# Patient Record
Sex: FEMALE | Race: WHITE | NOT HISPANIC OR LATINO | URBAN - METROPOLITAN AREA
[De-identification: names, ages, dates, MRNs, and addresses within clinical notes are randomized per-mention and may not be internally consistent; named-entity substitution may affect disease eponyms.]

---

## 2017-05-02 ENCOUNTER — AMBULATORY - HEALTHEAST (OUTPATIENT)
Dept: ADMINISTRATIVE | Facility: CLINIC | Age: 82
End: 2017-05-02

## 2017-05-02 RX ORDER — ATORVASTATIN CALCIUM 20 MG/1
20 TABLET, FILM COATED ORAL DAILY
Status: SHIPPED | COMMUNITY
Start: 2016-10-26

## 2017-05-02 RX ORDER — ALBUTEROL SULFATE 90 UG/1
2 AEROSOL, METERED RESPIRATORY (INHALATION) EVERY 4 HOURS PRN
Status: SHIPPED | COMMUNITY
Start: 2016-10-26

## 2017-05-02 RX ORDER — AMOXICILLIN 250 MG
1 CAPSULE ORAL 2 TIMES DAILY PRN
Status: SHIPPED | COMMUNITY
Start: 2017-05-02

## 2017-05-02 RX ORDER — ALBUTEROL SULFATE 5 MG/ML
2.5 SOLUTION RESPIRATORY (INHALATION) EVERY 6 HOURS PRN
Status: SHIPPED | COMMUNITY
Start: 2017-02-03

## 2017-05-02 RX ORDER — LOSARTAN POTASSIUM 100 MG/1
100 TABLET ORAL DAILY
Status: SHIPPED | COMMUNITY
Start: 2016-10-26

## 2017-05-02 RX ORDER — CYCLOBENZAPRINE HCL 5 MG
5 TABLET ORAL 2 TIMES DAILY
Status: SHIPPED | COMMUNITY
Start: 2017-05-02

## 2017-05-02 RX ORDER — TIOTROPIUM BROMIDE 18 UG/1
18 CAPSULE ORAL; RESPIRATORY (INHALATION) DAILY
Status: SHIPPED | COMMUNITY
Start: 2016-10-26

## 2017-05-02 RX ORDER — LIDOCAINE 50 MG/G
1 PATCH TOPICAL EVERY 24 HOURS
Status: SHIPPED | COMMUNITY
Start: 2017-05-02

## 2017-05-02 RX ORDER — ACETAMINOPHEN 325 MG/1
325 TABLET ORAL EVERY 4 HOURS PRN
Status: SHIPPED | COMMUNITY
Start: 2017-05-02

## 2017-05-02 RX ORDER — TRAMADOL HYDROCHLORIDE 50 MG/1
50 TABLET ORAL EVERY 6 HOURS PRN
Status: SHIPPED | COMMUNITY
Start: 2017-05-02

## 2017-05-04 ENCOUNTER — OFFICE VISIT - HEALTHEAST (OUTPATIENT)
Dept: GERIATRICS | Facility: CLINIC | Age: 82
End: 2017-05-04

## 2017-05-04 DIAGNOSIS — I67.1 CEREBRAL ANEURYSM: ICD-10-CM

## 2017-05-04 DIAGNOSIS — R27.0 ATAXIA: ICD-10-CM

## 2017-05-04 DIAGNOSIS — R09.02 HYPOXIA: ICD-10-CM

## 2017-05-04 DIAGNOSIS — M81.0 OSTEOPOROSIS: ICD-10-CM

## 2017-05-04 DIAGNOSIS — M41.9 SCOLIOSIS OF LUMBAR SPINE: ICD-10-CM

## 2017-05-04 DIAGNOSIS — C44.90 SKIN CANCER: ICD-10-CM

## 2017-05-04 DIAGNOSIS — M51.369 DDD (DEGENERATIVE DISC DISEASE), LUMBAR: ICD-10-CM

## 2017-05-04 DIAGNOSIS — I10 HYPERTENSION: ICD-10-CM

## 2017-05-04 DIAGNOSIS — I27.20 PULMONARY HYPERTENSION (H): ICD-10-CM

## 2017-05-04 DIAGNOSIS — R55 SYNCOPE, UNSPECIFIED SYNCOPE TYPE: ICD-10-CM

## 2017-05-04 DIAGNOSIS — S72.002D CLOSED FRACTURE OF LEFT HIP, WITH ROUTINE HEALING, SUBSEQUENT ENCOUNTER: ICD-10-CM

## 2017-05-04 DIAGNOSIS — J44.9 COPD (CHRONIC OBSTRUCTIVE PULMONARY DISEASE) (H): ICD-10-CM

## 2017-05-04 DIAGNOSIS — F32.89 MINOR DEPRESSIVE DISORDER: ICD-10-CM

## 2017-05-08 ENCOUNTER — OFFICE VISIT - HEALTHEAST (OUTPATIENT)
Dept: GERIATRICS | Facility: CLINIC | Age: 82
End: 2017-05-08

## 2017-05-08 DIAGNOSIS — R60.9 EDEMA: ICD-10-CM

## 2017-05-08 DIAGNOSIS — I10 ESSENTIAL HYPERTENSION: ICD-10-CM

## 2017-05-08 DIAGNOSIS — M81.0 OSTEOPOROSIS: ICD-10-CM

## 2017-05-08 DIAGNOSIS — S72.002D CLOSED FRACTURE OF LEFT HIP, WITH ROUTINE HEALING, SUBSEQUENT ENCOUNTER: ICD-10-CM

## 2017-05-08 DIAGNOSIS — I67.1 CEREBRAL ANEURYSM: ICD-10-CM

## 2017-05-08 DIAGNOSIS — M51.369 DDD (DEGENERATIVE DISC DISEASE), LUMBAR: ICD-10-CM

## 2017-05-08 DIAGNOSIS — R09.02 HYPOXIA: ICD-10-CM

## 2017-05-08 DIAGNOSIS — F32.89 MINOR DEPRESSIVE DISORDER: ICD-10-CM

## 2017-05-08 DIAGNOSIS — I27.20 PULMONARY HYPERTENSION (H): ICD-10-CM

## 2017-05-08 DIAGNOSIS — J44.9 COPD (CHRONIC OBSTRUCTIVE PULMONARY DISEASE) (H): ICD-10-CM

## 2017-05-08 DIAGNOSIS — R27.0 ATAXIA: ICD-10-CM

## 2017-05-10 RX ORDER — HYDRALAZINE HYDROCHLORIDE 10 MG/1
10 TABLET, FILM COATED ORAL 3 TIMES DAILY
Status: SHIPPED | COMMUNITY
Start: 2017-05-10

## 2017-05-10 RX ORDER — FUROSEMIDE 20 MG
20 TABLET ORAL DAILY
Status: SHIPPED | COMMUNITY
Start: 2017-05-10

## 2017-05-11 ENCOUNTER — OFFICE VISIT - HEALTHEAST (OUTPATIENT)
Dept: GERIATRICS | Facility: CLINIC | Age: 82
End: 2017-05-11

## 2017-05-11 DIAGNOSIS — R60.9 EDEMA: ICD-10-CM

## 2017-05-11 DIAGNOSIS — I27.20 PULMONARY HYPERTENSION (H): ICD-10-CM

## 2017-05-11 DIAGNOSIS — I10 ESSENTIAL HYPERTENSION: ICD-10-CM

## 2017-05-11 DIAGNOSIS — R09.02 HYPOXIA: ICD-10-CM

## 2017-05-11 DIAGNOSIS — R63.0 LACK OF APPETITE: ICD-10-CM

## 2017-05-11 DIAGNOSIS — R27.0 ATAXIA: ICD-10-CM

## 2017-05-11 DIAGNOSIS — M51.369 DDD (DEGENERATIVE DISC DISEASE), LUMBAR: ICD-10-CM

## 2017-05-11 DIAGNOSIS — J44.9 COPD (CHRONIC OBSTRUCTIVE PULMONARY DISEASE) (H): ICD-10-CM

## 2017-05-11 DIAGNOSIS — S72.002D CLOSED FRACTURE OF LEFT HIP, WITH ROUTINE HEALING, SUBSEQUENT ENCOUNTER: ICD-10-CM

## 2017-05-11 DIAGNOSIS — I67.1 CEREBRAL ANEURYSM: ICD-10-CM

## 2017-05-11 DIAGNOSIS — F41.9 ANXIETY: ICD-10-CM

## 2017-05-16 ENCOUNTER — OFFICE VISIT - HEALTHEAST (OUTPATIENT)
Dept: GERIATRICS | Facility: CLINIC | Age: 82
End: 2017-05-16

## 2017-05-16 DIAGNOSIS — S72.002D CLOSED FRACTURE OF LEFT HIP, WITH ROUTINE HEALING, SUBSEQUENT ENCOUNTER: ICD-10-CM

## 2017-05-16 DIAGNOSIS — M41.9 SCOLIOSIS OF LUMBAR SPINE: ICD-10-CM

## 2017-05-16 DIAGNOSIS — M51.369 DDD (DEGENERATIVE DISC DISEASE), LUMBAR: ICD-10-CM

## 2017-05-16 DIAGNOSIS — R55 SYNCOPE, UNSPECIFIED SYNCOPE TYPE: ICD-10-CM

## 2017-05-16 RX ORDER — BUSPIRONE HYDROCHLORIDE 15 MG/1
15 TABLET ORAL 3 TIMES DAILY
Status: SHIPPED | COMMUNITY
Start: 2017-05-16

## 2017-05-16 RX ORDER — MEGESTROL ACETATE 40 MG/1
200 TABLET ORAL 2 TIMES DAILY
Status: SHIPPED | COMMUNITY
Start: 2017-05-16

## 2017-05-19 ENCOUNTER — OFFICE VISIT - HEALTHEAST (OUTPATIENT)
Dept: GERIATRICS | Facility: CLINIC | Age: 82
End: 2017-05-19

## 2017-05-19 DIAGNOSIS — S72.002D CLOSED FRACTURE OF LEFT HIP, WITH ROUTINE HEALING, SUBSEQUENT ENCOUNTER: ICD-10-CM

## 2017-05-19 DIAGNOSIS — M41.9 SCOLIOSIS OF LUMBAR SPINE: ICD-10-CM

## 2017-05-19 DIAGNOSIS — M81.0 OSTEOPOROSIS: ICD-10-CM

## 2017-05-19 DIAGNOSIS — R63.0 POOR APPETITE: ICD-10-CM

## 2017-05-19 DIAGNOSIS — J44.9 CHRONIC OBSTRUCTIVE PULMONARY DISEASE, UNSPECIFIED COPD TYPE (H): ICD-10-CM

## 2017-05-19 DIAGNOSIS — I27.20 PULMONARY HYPERTENSION (H): ICD-10-CM

## 2017-05-19 DIAGNOSIS — I10 ESSENTIAL HYPERTENSION: ICD-10-CM

## 2017-05-19 DIAGNOSIS — R55 SYNCOPE, UNSPECIFIED SYNCOPE TYPE: ICD-10-CM

## 2017-05-23 ENCOUNTER — OFFICE VISIT - HEALTHEAST (OUTPATIENT)
Dept: GERIATRICS | Facility: CLINIC | Age: 82
End: 2017-05-23

## 2017-05-23 DIAGNOSIS — R09.02 HYPOXIA: ICD-10-CM

## 2017-05-23 DIAGNOSIS — I10 ESSENTIAL HYPERTENSION: ICD-10-CM

## 2017-05-23 DIAGNOSIS — I27.20 PULMONARY HYPERTENSION (H): ICD-10-CM

## 2017-05-23 DIAGNOSIS — J44.9 COPD (CHRONIC OBSTRUCTIVE PULMONARY DISEASE) (H): ICD-10-CM

## 2017-05-23 DIAGNOSIS — S72.002D CLOSED FRACTURE OF LEFT HIP, WITH ROUTINE HEALING, SUBSEQUENT ENCOUNTER: ICD-10-CM

## 2017-05-23 DIAGNOSIS — M81.0 OSTEOPOROSIS: ICD-10-CM

## 2017-05-23 DIAGNOSIS — M51.369 DDD (DEGENERATIVE DISC DISEASE), LUMBAR: ICD-10-CM

## 2017-05-23 DIAGNOSIS — M41.9 SCOLIOSIS OF LUMBAR SPINE: ICD-10-CM

## 2017-05-26 ENCOUNTER — AMBULATORY - HEALTHEAST (OUTPATIENT)
Dept: GERIATRICS | Facility: CLINIC | Age: 82
End: 2017-05-26

## 2021-06-10 NOTE — PROGRESS NOTES
[facility]      Facility:    [facility] [[id number]]    Code Status: FULL CODE      Chief Complaint/Reason for Visit:  Chief Complaint   Patient presents with     Problem Visit     hip fx         HPI:   Antonio is a 85 y.o. female who has a history of hypertension, severe COPD on chronic oxygen, pulmonary hypertension and syncope.     She was admitted to [facility] on April-26-20 17 after a fall. She is not sure if she passed out. She had a syncopal workup done a couple years prior which did not reveal cardiac findings for syncope.  Imaging showed comminuted mildly displaced anterior trochanteric left femur fracture with moderate varus.deformity. Pelvic x-ray did not find fractures. She had an intramedullary agustina with interlocking femoral neck screw fixation on 4-.     Hemoglobin went from 12.6 down to 6.9 during the hospitalization, and she received 1 unit of packed red cells. Hemoglobin was 8.2 on discharge. She was on Lovenox for 2 weeks per Orthopedics, at follow-up they will determine longer therapy is warranted.( Blood Loss Anemia)     For her possible syncope she was admitted to telemetry, cardiology saw her. Echocardiogram was done showing diastolic dysfunction, pulmonary hypertension. Further workup was not deemed necessary during the hospitalization.She and her primary care can con whether or not she needs on  sending her to Radiology for further cardiac workup as an outpatient.     Her hydralazine had been held for hypotension. Plan was to restart if she got hypertensive in the TCU     She was hypoxic throughout the hospitalization.       Past Medical History:  Past Medical History:   Diagnosis Date     Acute exacerbation of chronic obstructive pulmonary disease (COPD)      Ataxia      Cerebral aneurysm      Closed fracture of left hip      COPD (chronic obstructive pulmonary disease)      DDD (degenerative disc disease), lumbar      Hyperglycemia       Hyperlipidemia      Hypertension      Hypoxia      Minor depressive disorder      Osteoporosis      Pulmonary hypertension      Raynaud's syndrome      Scoliosis of lumbar spine      Skin cancer      Syncope            Review of Systems     Worried about her high BPs ( Her Hydralazine was held in the hospital due to hypotension)  Walked 70 ' with therapist      Vitals:    05/08/17 0930   BP: 174/75   Pulse: 82   Resp: 20   Temp: 97.3  F (36.3  C)   SpO2: 98%       Physical Exam   Pleasant  Cardiovascular: Regular rhythm and normal heart sounds.   No murmur heard.  Pulmonary/Chest: end inspiratory rales bilaterally, fine   Abdominal: Soft. Bowel sounds are normal. There is no tenderness.   Musculoskeletal:   No drainage on surgical incision area  2+ edema of the left lower leg and ankle   Lymphadenopathy:   She has no cervical adenopathy.   Neurological: She is alert.   Short-term memory deficits,delay in response at some times   Skin: Skin is warm and dry. No rash noted.   Psychiatric: She has a normal mood and affect. Her behavior is normal.       Medication List:  Current Outpatient Prescriptions   Medication Sig     hydrALAZINE (APRESOLINE) 10 MG tablet Take 10 mg by mouth 3 (three) times a day.     acetaminophen (TYLENOL) 325 MG tablet Take 325 mg by mouth every 4 (four) hours as needed.     albuterol (PROAIR HFA;PROVENTIL HFA;VENTOLIN HFA) 90 mcg/actuation inhaler Inhale 2 puffs every 4 (four) hours as needed.     albuterol (PROVENTIL) 5 mg/mL nebulizer solution Inhale 2.5 mg every 6 (six) hours as needed.     atorvastatin (LIPITOR) 20 MG tablet Take 20 mg by mouth daily.     cyclobenzaprine (FLEXERIL) 5 MG tablet Take 5 mg by mouth 2 (two) times a day.     diltiazem (CARDIZEM LA) 120 mg 24 hr tablet Take 120 mg by mouth daily.     enoxaparin (LOVENOX) 40 mg/0.4 mL syringe Inject 40 mg under the skin daily.     fluticasone (FLONASE) 50 mcg/actuation nasal spray 1 spray into each nostril daily as needed.      fluticasone-salmeterol (ADVAIR) 100-50 mcg/dose DISKUS Inhale 1 puff 2 (two) times a day.     lidocaine (LIDODERM) 5 % Place 1 patch on the skin daily. up to 12 hours within a 24-hour period     losartan (COZAAR) 100 MG tablet Take 100 mg by mouth daily.     peg 400-propylene glycol (SYSTANE) 0.4-0.3 % Drop Administer 2 drops to both eyes 3 (three) times a day.     senna-docusate (PERICOLACE) 8.6-50 mg tablet Take 1 tablet by mouth 2 (two) times a day as needed.     tiotropium (SPIRIVA) 18 mcg inhalation capsule Place 18 mcg into inhaler and inhale daily.     traMADol (ULTRAM) 50 mg tablet Take 50 mg by mouth every 6 (six) hours as needed.       Labs:  Hb incr to 9.5  Decrease Na 133    Assessment / Plan :  1. Closed fracture of left hip, with routine healing, subsequent encounter S72.002D PT,OT. Pain control w tramadol. Lovenox . Lidoerm patch    2. COPD (chronic obstructive pulmonary disease) J44.9 Advair , Spiriva ,albuterol inhaler, albuterol nebulizer    3. Pulmonary hypertension I27.2     4. Hypertension I10 diltiazem LA , losartan . Add back Hydralazine   5. DDD (degenerative disc disease), lumbar M51.36     6. Cerebral aneurysm I67.1  not all cognitively there   7. Ataxia R27.0     8. Hypoxia R09.02     9. Osteoporosis M81.0     10 Edema R60.9 Lasix 20 mgdaily. Weight M/W/F   10. Syncope, unspecified syncope type R55            Electronically signed by: Chante Quiroz MD

## 2021-06-10 NOTE — PROGRESS NOTES
[facility]      Facility:    [facility] [[id number]]    Code Status: FULL CODE      Chief Complaint/Reason for Visit:  Chief Complaint   Patient presents with     Problem Visit     left hip fracture         HPI:   Antonio is a 85 y.o. female who has a history of hypertension, severe COPD on chronic oxygen, pulmonary hypertension and syncope.     She was admitted to [facility] on April-26-20 17 after a fall. She is not sure if she passed out. She had a syncopal workup done a couple years prior which did not reveal cardiac findings for syncope.  Imaging showed comminuted mildly displaced anterior trochanteric left femur fracture with moderate varus.deformity. Pelvic x-ray did not find fractures. She had an intramedullary agustina with interlocking femoral neck screw fixation on 4-.     Hemoglobin went from 12.6 down to 6.9 during the hospitalization, and she received 1 unit of packed red cells. Hemoglobin was 8.2 on discharge. She was on Lovenox for 2 weeks per Orthopedics, at follow-up they will determine longer therapy is warranted.( Blood Loss Anemia)     For her possible syncope she was admitted to telemetry, cardiology saw her. Echocardiogram was done showing diastolic dysfunction, pulmonary hypertension. Further workup was not deemed necessary during the hospitalization.She and her primary care can con whether or not she needs on  sending her to Radiology for further cardiac workup as an outpatient.     Her hydralazine had been held for hypotension. Plan was to restart if she got hypertensive in the TCU     She was hypoxic throughout the hospitalization.       Past Medical History:  Past Medical History:   Diagnosis Date     Acute exacerbation of chronic obstructive pulmonary disease (COPD)      Ataxia      Cerebral aneurysm      Closed fracture of left hip      COPD (chronic obstructive pulmonary disease)      DDD (degenerative disc disease), lumbar       Hyperglycemia      Hyperlipidemia      Hypertension      Hypoxia      Minor depressive disorder      Osteoporosis      Pulmonary hypertension      Raynaud's syndrome      Scoliosis of lumbar spine      Skin cancer      Syncope          Current Outpatient Prescriptions   Medication Sig     acetaminophen (TYLENOL) 325 MG tablet Take 325 mg by mouth every 4 (four) hours as needed.     albuterol (PROAIR HFA;PROVENTIL HFA;VENTOLIN HFA) 90 mcg/actuation inhaler Inhale 2 puffs every 4 (four) hours as needed.     albuterol (PROVENTIL) 5 mg/mL nebulizer solution Inhale 2.5 mg every 6 (six) hours as needed.     atorvastatin (LIPITOR) 20 MG tablet Take 20 mg by mouth daily.     busPIRone (BUSPAR) 15 MG tablet Take 15 mg by mouth 3 (three) times a day.     cyclobenzaprine (FLEXERIL) 5 MG tablet Take 5 mg by mouth 2 (two) times a day.     diltiazem (CARDIZEM LA) 120 mg 24 hr tablet Take 120 mg by mouth daily.     fluticasone (FLONASE) 50 mcg/actuation nasal spray 1 spray into each nostril daily as needed.     fluticasone-salmeterol (ADVAIR) 100-50 mcg/dose DISKUS Inhale 1 puff 2 (two) times a day.     furosemide (LASIX) 20 MG tablet Take 20 mg by mouth daily.     hydrALAZINE (APRESOLINE) 10 MG tablet Take 10 mg by mouth 3 (three) times a day.     lidocaine (LIDODERM) 5 % Place 1 patch on the skin daily. up to 12 hours within a 24-hour period     losartan (COZAAR) 100 MG tablet Take 100 mg by mouth daily.     megestrol (MEGACE) 40 MG tablet Take 200 mg by mouth 2 (two) times a day.     peg 400-propylene glycol (SYSTANE) 0.4-0.3 % Drop Administer 2 drops to both eyes 3 (three) times a day.     senna-docusate (PERICOLACE) 8.6-50 mg tablet Take 1 tablet by mouth 2 (two) times a day as needed.     tiotropium (SPIRIVA) 18 mcg inhalation capsule Place 18 mcg into inhaler and inhale daily.     traMADol (ULTRAM) 50 mg tablet Take 50 mg by mouth every 6 (six) hours as needed.           Review of Systems   Appetite is a bit better  Pain  just after therapy, Tylenol helps.  Sleeping well    Physical Exam   Pleasant  Cardiovascular: Regular rhythm and normal heart sounds. No murmur   Pulmonary/Chest: end inspiratory rales bilaterally, fine   Abdominal: Soft. Bowel sounds are normal. There is no tenderness.   Musculoskeletal:   No drainage on surgical incision area  2+ edema of the left lower leg and ankle    Neurological: She is alert.   Memory more intact  Skin: Skin is warm and dry. No rash noted.   Psychiatric: She has a normal mood and affect. Her behavior is normal.        Assessment / Plan :    ICD-10-CM    1. Closed fracture of left hip, with routine healing, subsequent encounter S72.002D Rehabilitating well.  Has nearly completed therapy    2. Syncope, unspecified syncope type R55  no cardiac source was found in the hospital    3. Essential hypertension I10  continues on losartan    4. Chronic obstructive pulmonary disease, unspecified COPD type J44.9  albuterol inhaler and nebulizer, fluticasone salmeterol , Spiriva    5. Pulmonary hypertension I27.2    6. Scoliosis of lumbar spine M41.9    7  poor appetite  R63.0  Megace    8. Osteoporosis M81.0          Doing well, will complete therapy within a week        Electronically signed by: Chante Quiroz MD

## 2021-06-10 NOTE — PROGRESS NOTES
[facility]      Facility:    [facility] [[id number]]    Code Status: FULL CODE      Chief Complaint/Reason for Visit:  Chief Complaint   Patient presents with     Problem Visit     face to face for 4 wheeled walker         HPI:   Antonio is a 85 y.o. female who has a history of hypertension, severe COPD on chronic oxygen, pulmonary hypertension and syncope.     She was admitted to [facility] on April-26-20 17 after a fall. She is not sure if she passed out. She had a syncopal workup done a couple years prior which did not reveal cardiac findings for syncope.  Imaging showed comminuted mildly displaced anterior trochanteric left femur fracture with moderate varus.deformity. Pelvic x-ray did not find fractures. She had an intramedullary agustina with interlocking femoral neck screw fixation on 4-.     Hemoglobin went from 12.6 down to 6.9 during the hospitalization, and she received 1 unit of packed red cells. Hemoglobin was 8.2 on discharge. She was on Lovenox for 2 weeks per Orthopedics, at follow-up they will determine longer therapy is warranted.( Blood Loss Anemia)     For her possible syncope she was admitted to telemetry, cardiology saw her. Echocardiogram was done showing diastolic dysfunction, pulmonary hypertension. Further workup was not deemed necessary during the hospitalization.She and her primary care can con whether or not she needs on  sending her to Radiology for further cardiac workup as an outpatient.     Her hydralazine had been held for hypotension. Plan was to restart if she got hypertensive in the TCU     She was hypoxic throughout the hospitalization.       Past Medical History:  Past Medical History:   Diagnosis Date     Acute exacerbation of chronic obstructive pulmonary disease (COPD)      Ataxia      Cerebral aneurysm      Closed fracture of left hip      COPD (chronic obstructive pulmonary disease)      DDD (degenerative disc disease),  lumbar      Hyperglycemia      Hyperlipidemia      Hypertension      Hypoxia      Minor depressive disorder      Osteoporosis      Pulmonary hypertension      Raynaud's syndrome      Scoliosis of lumbar spine      Skin cancer      Syncope            Review of Systems   Appetite is a bit better  Pain just after therapy, Tylenol helps    Physical Exam   Pleasant  Cardiovascular: Regular rhythm and normal heart sounds. No murmur   Pulmonary/Chest: end inspiratory rales bilaterally, fine   Abdominal: Soft. Bowel sounds are normal. There is no tenderness.   Musculoskeletal:   No drainage on surgical incision area  2+ edema of the left lower leg and ankle    Neurological: She is alert.   Memory more intact  Skin: Skin is warm and dry. No rash noted.   Psychiatric: She has a normal mood and affect. Her behavior is normal.      Face to Face 5/16/17 for a Four wheeled Walker    She suffered a comminuted mildly displaced anterior trochanteric left femur fracture at the end of April 2017.  She has significant impaired mobility affecting her mobility related activities of daily living  Patient's mobility limitation cannot be sufficiently resolved by the use of an appropriately fitted cane or walker  [facility]'s home has adequate access between rooms, maneuvering space, and surfaces for the use of manual wheelchair  Manual wheelchair will significantly improve the patient's ability to participate in more mobility related ADLs and she will use it daily many hours a day at home  Patient is willing to use the manual wheelchair that is provided  She does have sufficient upper extremity function and physical/mental capabilities to safely self propel the wheelchair    Order for 4 wheeled walker with brakes and seat start date 5-15-17  Estimated length of need: Lifetime      Assessment / Plan :      ICD-10-CM    1. Closed fracture of left hip, with routine healing, subsequent encounter S72.002D    2. Syncope, unspecified syncope type R55     3. Scoliosis of lumbar spine M41.9    4. DDD (degenerative disc disease), lumbar M51.36          Submit to [facility]    Electronically signed by: Chante Quiroz MD

## 2021-06-10 NOTE — PROGRESS NOTES
[facility]    Facility:   [facility] [[id number]]     Code Status: FULL CODE  PCP: Nadia Dickerson MD   Phone: [phone]   Fax: [phone]      CHIEF COMPLAINT/REASON FOR VISIT:  Chief Complaint   Patient presents with     Discharge Summary     hip fx       HISTORY COURSE:  Ms. Perkins was admitted to [facility] on April-26-20 17 after a fall. She is not sure if she passed out. She had a syncopal workup done a couple years prior which did not reveal cardiac findings for syncope.  Imaging showed comminuted mildly displaced anterior trochanteric left femur fracture with moderate varus.deformity. Pelvic x-ray did not find fractures. She had an intramedullary agustina with interlocking femoral neck screw fixation on 4-.      Hemoglobin went from 12.6 down to 6.9 during the hospitalization, and she received 1 unit of packed red cells. Hemoglobin was 8.2 on discharge. She was on Lovenox for 2 weeks per Orthopedics, at follow-up they will determine longer therapy is warranted.( Blood Loss Anemia)    For her possible syncope she was admitted to telemetry, cardiology saw her. Echocardiogram was done showing diastolic dysfunction, pulmonary hypertension. Further workup was not deemed necessary during the hospitalization.She and her primary care can con whether or not she needs on  sending her to Radiology for further cardiac workup as an outpatient.    Her hydralazine had been held for hypotension. Plan was to restart if she got hypertensive in the TCU. She did have high pressures in the TCU, and Hydralazine was restarted.    She did require continuous oxygen therapy through the first couple weeks of therapy but then was able to wean off.  She had very minimal pain initially, as she was more active in therapy she had some pain but it taken her self off tramadol.    She completed therapy is returning to home with services.      Review of Systems     Requested that  Tramadol be discontinued. Did not want a prescription for discharge; however her daughter thought it would be good to have a RX for backup.    She is ready for discharge, does not have any concerns about functioning well at her home.    Vitals:    05/23/17 1000   BP: 124/74   Pulse: (!) 105   Temp: 98.1  F (36.7  C)   SpO2: 92%       Physical Exam  Pleasant  Cardiovascular: Regular rhythm and normal heart sounds.   No murmur heard.  Pulmonary/Chest: end inspiratory rales bilaterally, fine   Abdominal: Soft. Bowel sounds are normal. There is no tenderness.   Musculoskeletal:   No drainage on surgical incision area  2+ edema of the left lower leg and ankle    Neurological: She is alert.   Seems to have intact  memory now  Skin: Skin is warm and dry. No rash noted.     MEDICATION LIST:  Current Outpatient Prescriptions   Medication Sig     acetaminophen (TYLENOL) 325 MG tablet Take 325 mg by mouth every 4 (four) hours as needed.     albuterol (PROAIR HFA;PROVENTIL HFA;VENTOLIN HFA) 90 mcg/actuation inhaler Inhale 2 puffs every 4 (four) hours as needed.     albuterol (PROVENTIL) 5 mg/mL nebulizer solution Inhale 2.5 mg every 6 (six) hours as needed.     atorvastatin (LIPITOR) 20 MG tablet Take 20 mg by mouth daily.     busPIRone (BUSPAR) 15 MG tablet Take 15 mg by mouth 3 (three) times a day.     cyclobenzaprine (FLEXERIL) 5 MG tablet Take 5 mg by mouth 2 (two) times a day.     diltiazem (CARDIZEM LA) 120 mg 24 hr tablet Take 120 mg by mouth daily.     fluticasone (FLONASE) 50 mcg/actuation nasal spray 1 spray into each nostril daily as needed.     fluticasone-salmeterol (ADVAIR) 100-50 mcg/dose DISKUS Inhale 1 puff 2 (two) times a day.     furosemide (LASIX) 20 MG tablet Take 20 mg by mouth daily.     hydrALAZINE (APRESOLINE) 10 MG tablet Take 10 mg by mouth 3 (three) times a day.     lidocaine (LIDODERM) 5 % Place 1 patch on the skin daily. up to 12 hours within a 24-hour period     losartan (COZAAR) 100 MG tablet  Take 100 mg by mouth daily.     megestrol (MEGACE) 40 MG tablet Take 200 mg by mouth 2 (two) times a day.     peg 400-propylene glycol (SYSTANE) 0.4-0.3 % Drop Administer 2 drops to both eyes 3 (three) times a day.     senna-docusate (PERICOLACE) 8.6-50 mg tablet Take 1 tablet by mouth 2 (two) times a day as needed.     tiotropium (SPIRIVA) 18 mcg inhalation capsule Place 18 mcg into inhaler and inhale daily.     traMADol (ULTRAM) 50 mg tablet Take 50 mg by mouth every 6 (six) hours as needed.       DISCHARGE DIAGNOSIS:    ICD-10-CM    1. Closed fracture of left hip, with routine healing, subsequent encounter S72.002D    2. COPD (chronic obstructive pulmonary disease) J44.9    3. Pulmonary hypertension I27.2    4. Hypoxia R09.02    5. DDD (degenerative disc disease), lumbar M51.36    6. Essential hypertension I10    7. Scoliosis of lumbar spine M41.9    8. Osteoporosis M81.0        MEDICAL EQUIPMENT NEEDS:  Four wheeled walker    DISCHARGE PLAN/FACE TO FACE:  I certify that services are/were furnished while this patient was under the care of a physician and that a physician or an allowed non-physician practitioner (NPP), had a face-to-face encounter that meets the physician face-to-face encounter requirements. The encounter was in whole, or in part, related to the primary reason for home health. The patient is confined to his/her home and needs intermittent skilled nursing, physical therapy, speech-language pathology, or the continued need for occupational therapy. A plan of care has been established by a physician and is periodically reviewed by a physician.  Date of Face-to-Face Encounter: 5/23/17    I certify that, based on my findings, the following services are medically necessary home health services:  -home PT to advance functional mobility in her home  - Home nursing to evaluate medication management  - Home Health Aide for bathing and personal cares      My clinical findings support the need for the above  skilled services because: She needs an evaluation on her home setting, RN and aide to assist in the transition home    This patient is homebound because:Can not yet drive to get to appointments    The patient is, or has been, under my care and I have initiated the establishment of the plan of care. This patient will be followed by a physician who will periodically review the plan of care.      Electronically signed by: Chante Quiroz MD

## 2021-06-10 NOTE — PROGRESS NOTES
[facility]                                                        DOS: 5/12/17    Facility:    [facility] [[id number]]    Code Status: FULL CODE      Chief Complaint/Reason for Visit:  Chief Complaint   Patient presents with     Problem Visit     poor appetite / anxiety         HPI:   Antonio is a 85 y.o. female who has a history of hypertension, severe COPD on chronic oxygen, pulmonary hypertension and syncope.     She was admitted to [facility] on April-26-20 17 after a fall. She is not sure if she passed out. She had a syncopal workup done a couple years prior which did not reveal cardiac findings for syncope.  Imaging showed comminuted mildly displaced anterior trochanteric left femur fracture with moderate varus.deformity. Pelvic x-ray did not find fractures. She had an intramedullary agustina with interlocking femoral neck screw fixation on 4-.     Hemoglobin went from 12.6 down to 6.9 during the hospitalization, and she received 1 unit of packed red cells. Hemoglobin was 8.2 on discharge. She was on Lovenox for 2 weeks per Orthopedics, at follow-up they will determine longer therapy is warranted.( Blood Loss Anemia)     For her possible syncope she was admitted to telemetry, cardiology saw her. Echocardiogram was done showing diastolic dysfunction, pulmonary hypertension. Further workup was not deemed necessary during the hospitalization.She and her primary care can con whether or not she needs on  sending her to Radiology for further cardiac workup as an outpatient.     Her hydralazine had been held for hypotension. Plan was to restart if she got hypertensive in the TCU     She was hypoxic throughout the hospitalization.       Past Medical History:  Past Medical History:   Diagnosis Date     Acute exacerbation of chronic obstructive pulmonary disease (COPD)      Ataxia      Cerebral aneurysm      Closed fracture of left hip      COPD (chronic obstructive  pulmonary disease)      DDD (degenerative disc disease), lumbar      Hyperglycemia      Hyperlipidemia      Hypertension      Hypoxia      Minor depressive disorder      Osteoporosis      Pulmonary hypertension      Raynaud's syndrome      Scoliosis of lumbar spine      Skin cancer      Syncope            Review of Systems   Quite anxious, daughter told staff she used to be on Celexa  Poor appetite continues, open to megace  Worried about her high BPs, we restarted Hydralazine was held in the hospital due to hypotension  Knees are achy from OA      Vitals were reviewed, better BP    Physical Exam   Pleasant  Cardiovascular: Regular rhythm and normal heart sounds.   No murmur heard.  Pulmonary/Chest: end inspiratory rales bilaterally, fine   Abdominal: Soft. Bowel sounds are normal. There is no tenderness.   Musculoskeletal:   No drainage on surgical incision area  2+ edema of the left lower leg and ankle   Lymphadenopathy:   She has no cervical adenopathy.   Neurological: She is alert.   Short-term memory deficits  Skin: Skin is warm and dry. No rash noted.   Psychiatric: She has a normal mood and affect. Her behavior is normal.       Medication List:  Current Outpatient Prescriptions   Medication Sig     acetaminophen (TYLENOL) 325 MG tablet Take 325 mg by mouth every 4 (four) hours as needed.     albuterol (PROAIR HFA;PROVENTIL HFA;VENTOLIN HFA) 90 mcg/actuation inhaler Inhale 2 puffs every 4 (four) hours as needed.     albuterol (PROVENTIL) 5 mg/mL nebulizer solution Inhale 2.5 mg every 6 (six) hours as needed.     atorvastatin (LIPITOR) 20 MG tablet Take 20 mg by mouth daily.     cyclobenzaprine (FLEXERIL) 5 MG tablet Take 5 mg by mouth 2 (two) times a day.     diltiazem (CARDIZEM LA) 120 mg 24 hr tablet Take 120 mg by mouth daily.     enoxaparin (LOVENOX) 40 mg/0.4 mL syringe Inject 40 mg under the skin daily.     fluticasone (FLONASE) 50 mcg/actuation nasal spray 1 spray into each nostril daily as needed.      fluticasone-salmeterol (ADVAIR) 100-50 mcg/dose DISKUS Inhale 1 puff 2 (two) times a day.     furosemide (LASIX) 20 MG tablet Take 20 mg by mouth daily.     hydrALAZINE (APRESOLINE) 10 MG tablet Take 10 mg by mouth 3 (three) times a day.     lidocaine (LIDODERM) 5 % Place 1 patch on the skin daily. up to 12 hours within a 24-hour period     losartan (COZAAR) 100 MG tablet Take 100 mg by mouth daily.     peg 400-propylene glycol (SYSTANE) 0.4-0.3 % Drop Administer 2 drops to both eyes 3 (three) times a day.     senna-docusate (PERICOLACE) 8.6-50 mg tablet Take 1 tablet by mouth 2 (two) times a day as needed.     tiotropium (SPIRIVA) 18 mcg inhalation capsule Place 18 mcg into inhaler and inhale daily.     traMADol (ULTRAM) 50 mg tablet Take 50 mg by mouth every 6 (six) hours as needed.       Labs:  Hb incr to 9.5  Decrease Na 133    Assessment / Plan :  1. Closed fracture of left hip, with routine healing, subsequent encounter S72.002D PT,OT. Pain control w tramadol. Lovenox . Lidoerm patch    2. COPD (chronic obstructive pulmonary disease) J44.9 Advair , Spiriva ,albuterol inhaler, albuterol nebulizer    3. Pulmonary hypertension I27.2     4. Hypertension I10 diltiazem LA , losartan . Add back Hydralazine   5. DDD (degenerative disc disease), lumbar M51.36     6. Cerebral aneurysm I67.1  not all cognitively there   7. Ataxia R27.0     8. Hypoxia R09.02     9 Edema R60.9 Lasix 20 mgdaily. Weight M/W/F   10 Lack of Appetite R63.0 Megace trial   11 Anxiety F41.9 Buspar           Electronically signed by: Chante Quiroz MD

## 2021-06-10 NOTE — PROGRESS NOTES
[facility]      Facility:    [facility] [[id number]]    Code Status: FULL CODE      Chief Complaint/Reason for Visit:  Chief Complaint   Patient presents with     H & P     left hip fracture       HPI:   Antonio is a 85 y.o. female who has a history of hypertension, severe COPD on chronic oxygen, pulmonary hypertension and syncope.    She was admitted to [facility] on April-26-20 17 after a fall.  She is not sure if she passed out.  She had a syncopal workup done a couple years prior which did not reveal cardiac findings for syncope.  Imaging showed comminuted mildly displaced anterior trochanteric left femur fracture with moderate varus.deformity.  Pelvic x-ray did not find fractures.  She had an intramedullary agustina with interlocking femoral neck screw fixation on 4-.     Hemoglobin went from 12.6 down to 6.9 during the hospitalization, and she received 1 unit of packed red cells.  Hemoglobin was 8.2 on discharge.  She was on Lovenox for 2 weeks per Orthopedics, at  follow-up they will determine longer therapy is warranted.( Blood Loss Anemia)    For her possible syncope she was admitted to telemetry, cardiology saw her.  Echocardiogram was done showing diastolic dysfunction, pulmonary hypertension.  Further workup was not deemed necessary during the hospitalization.She and her primary care can con whether or not she needs on  sending her to Radiology for further cardiac workup as an outpatient.    Her hydralazine had been held for hypotension.  Plan was to restart if she got hypertensive in the TCU    She was hypoxic throughout the hospitalization.        Past Medical History:  Past Medical History:   Diagnosis Date     Acute exacerbation of chronic obstructive pulmonary disease (COPD)      Ataxia      Cerebral aneurysm      Closed fracture of left hip      COPD (chronic obstructive pulmonary disease)      DDD (degenerative disc disease), lumbar       Hyperglycemia      Hyperlipidemia      Hypertension      Hypoxia      Minor depressive disorder      Osteoporosis      Pulmonary hypertension      Raynaud's syndrome      Scoliosis of lumbar spine      Skin cancer      Syncope            Surgical History:  Past Surgical History:   Procedure Laterality Date     CATARACT EXTRACTION Left 05/05/2015     CATARACT EXTRACTION Right 05/19/2015     HIP SURGERY Left 04/28/2017    LEFT FEMUR INTRAMEDULLARY NAILING     SD BRAIN AVM SURG DURAL COMPLX  1999     TUBAL LIGATION         Family History:   Family History   Problem Relation Age of Onset     Breast cancer Mother      Diabetes Mother      Alcohol abuse Father      Hypertension Brother      Hypertension Brother      Hypertension Brother      Ovarian cancer Neg Hx        Social History:    Social History     Social History     Marital status:      Spouse name: N/A     Number of children: N/A     Years of education: N/A     Social History Main Topics     Smoking status: Former Smoker     Packs/day: 1.00     Years: 30.00     Types: Cigarettes     Quit date: 5/22/1978     Smokeless tobacco: Never Used     Alcohol use Yes      Comment: occasional, glass of wine with dinner maybe monthly     Drug use: No     Sexual activity: No     Other Topics Concern     Not on file     Social History Narrative     No narrative on file          Review of Systems   Has left thigh stiffness and achy pain  Has chronic low-level dyspnea  The comprehensive review of systems is otherwise negative    Blood pressure 150/75, pulse 78, temperature 97.8  F (36.6  C), resp. rate 20, SpO2 98 %.        Physical Exam   Constitutional: She appears well-nourished. No distress.   HENT:   Right Ear: External ear normal.   Left Ear: External ear normal.   Nose: Nose normal.   Mouth/Throat: Oropharynx is clear and moist.   Eyes: Conjunctivae and EOM are normal. No scleral icterus.   Cardiovascular: Regular rhythm and normal heart sounds.    No murmur  heard.  Pulmonary/Chest: She has rales.   Abdominal: Soft. Bowel sounds are normal. There is no tenderness.   Musculoskeletal:   No drainage on surgical incision area  1+ edema of the left lower leg and ankle   Lymphadenopathy:     She has no cervical adenopathy.   Neurological: She is alert.   Short-term memory deficits,delay in response at some times   Skin: Skin is warm and dry. No rash noted.   Psychiatric: She has a normal mood and affect. Her behavior is normal.       Medication List:  Current Outpatient Prescriptions   Medication Sig     acetaminophen (TYLENOL) 325 MG tablet Take 325 mg by mouth every 4 (four) hours as needed.     albuterol (PROAIR HFA;PROVENTIL HFA;VENTOLIN HFA) 90 mcg/actuation inhaler Inhale 2 puffs every 4 (four) hours as needed.     albuterol (PROVENTIL) 5 mg/mL nebulizer solution Inhale 2.5 mg every 6 (six) hours as needed.     atorvastatin (LIPITOR) 20 MG tablet Take 20 mg by mouth daily.     cyclobenzaprine (FLEXERIL) 5 MG tablet Take 5 mg by mouth 2 (two) times a day.     diltiazem (CARDIZEM LA) 120 mg 24 hr tablet Take 120 mg by mouth daily.     enoxaparin (LOVENOX) 40 mg/0.4 mL syringe Inject 40 mg under the skin daily.     fluticasone (FLONASE) 50 mcg/actuation nasal spray 1 spray into each nostril daily as needed.     fluticasone-salmeterol (ADVAIR) 100-50 mcg/dose DISKUS Inhale 1 puff 2 (two) times a day.     furosemide (LASIX) 20 MG tablet Take 10 mg by mouth every morning.     lidocaine (LIDODERM) 5 % Place 1 patch on the skin daily. up to 12 hours within a 24-hour period     losartan (COZAAR) 100 MG tablet Take 100 mg by mouth daily.     peg 400-propylene glycol (SYSTANE) 0.4-0.3 % Drop Administer 2 drops to both eyes 3 (three) times a day.     senna-docusate (PERICOLACE) 8.6-50 mg tablet Take 1 tablet by mouth 2 (two) times a day as needed.     tiotropium (SPIRIVA) 18 mcg inhalation capsule Place 18 mcg into inhaler and inhale daily.     traMADol (ULTRAM) 50 mg tablet Take  50 mg by mouth every 6 (six) hours as needed.       Labs: 5/1/17    White count 6.0, hemoglobin 8.0, platelet count 147  Sodium 135, potassium 4.0, chloride 106, CO2 26, BUN 11, creatinine 0.66, calcium 8.1    CARDIAC ECHO:  Estimated ejection fraction 65-70%.  No significant valvular heart disease.  Elevated pulmonary artery systolic pressure of 55-50 9 mmHg.  Grade 1 left ventricular diastolic dysfunction    Assessment / Plan:      ICD-10-CM    1. Closed fracture of left hip, with routine healing, subsequent encounter S72.002D PT,OT. Pain control w tramadol.  Lovenox .  Lidoerm patch    2. COPD (chronic obstructive pulmonary disease) J44.9  Advair , Spiriva ,albuterol inhaler, albuterol nebulizer    3. Pulmonary hypertension I27.2    4. Scoliosis of lumbar spine M41.9    5. Hypertension I10  diltiazem LA , losartan    6. DDD (degenerative disc disease), lumbar M51.36    7. Cerebral aneurysm I67.1    8. Ataxia R27.0    9. Hypoxia R09.02    10. Minor depressive disorder F32.9    11. Osteoporosis M81.0    12. Skin cancer C44.90    13. Syncope, unspecified syncope type R55        Monitor her various issues during her rehabilitation.        Electronically signed by: Chante Quiroz MD